# Patient Record
Sex: MALE | Race: BLACK OR AFRICAN AMERICAN | Employment: UNEMPLOYED | URBAN - METROPOLITAN AREA
[De-identification: names, ages, dates, MRNs, and addresses within clinical notes are randomized per-mention and may not be internally consistent; named-entity substitution may affect disease eponyms.]

---

## 2021-01-01 ENCOUNTER — APPOINTMENT (OUTPATIENT)
Dept: CT IMAGING | Facility: HOSPITAL | Age: 51
DRG: 871 | End: 2021-01-01
Attending: HOSPITALIST
Payer: MEDICAID

## 2021-01-01 ENCOUNTER — TELEPHONE (OUTPATIENT)
Dept: HEMATOLOGY/ONCOLOGY | Facility: HOSPITAL | Age: 51
End: 2021-01-01

## 2021-01-01 ENCOUNTER — APPOINTMENT (OUTPATIENT)
Dept: CT IMAGING | Facility: HOSPITAL | Age: 51
DRG: 871 | End: 2021-01-01
Attending: INTERNAL MEDICINE
Payer: MEDICAID

## 2021-01-01 ENCOUNTER — HOSPITAL ENCOUNTER (INPATIENT)
Facility: HOSPITAL | Age: 51
LOS: 5 days | Discharge: HOME | DRG: 871 | End: 2021-01-01
Attending: EMERGENCY MEDICINE | Admitting: EMERGENCY MEDICINE
Payer: MEDICAID

## 2021-01-01 ENCOUNTER — APPOINTMENT (OUTPATIENT)
Dept: GENERAL RADIOLOGY | Facility: HOSPITAL | Age: 51
DRG: 871 | End: 2021-01-01
Attending: EMERGENCY MEDICINE
Payer: MEDICAID

## 2021-01-01 ENCOUNTER — APPOINTMENT (OUTPATIENT)
Dept: GENERAL RADIOLOGY | Facility: HOSPITAL | Age: 51
DRG: 871 | End: 2021-01-01
Attending: HOSPITALIST
Payer: MEDICAID

## 2021-01-01 ENCOUNTER — APPOINTMENT (OUTPATIENT)
Dept: NUCLEAR MEDICINE | Facility: HOSPITAL | Age: 51
DRG: 871 | End: 2021-01-01
Attending: INTERNAL MEDICINE
Payer: MEDICAID

## 2021-01-01 ENCOUNTER — APPOINTMENT (OUTPATIENT)
Dept: GENERAL RADIOLOGY | Facility: HOSPITAL | Age: 51
DRG: 871 | End: 2021-01-01
Attending: CLINICAL NURSE SPECIALIST
Payer: MEDICAID

## 2021-01-01 VITALS
WEIGHT: 120.13 LBS | TEMPERATURE: 97 F | RESPIRATION RATE: 18 BRPM | HEIGHT: 74 IN | HEART RATE: 57 BPM | DIASTOLIC BLOOD PRESSURE: 85 MMHG | SYSTOLIC BLOOD PRESSURE: 166 MMHG | OXYGEN SATURATION: 100 % | BODY MASS INDEX: 15.42 KG/M2

## 2021-01-01 DIAGNOSIS — U07.1 COVID-19: Primary | ICD-10-CM

## 2021-01-01 DIAGNOSIS — R79.89 ELEVATED PROCALCITONIN: ICD-10-CM

## 2021-01-01 DIAGNOSIS — R91.8 MASS OF UPPER LOBE OF RIGHT LUNG: ICD-10-CM

## 2021-01-01 DIAGNOSIS — D72.825 BANDEMIA: ICD-10-CM

## 2021-01-01 DIAGNOSIS — J18.1 CONSOLIDATION OF RIGHT UPPER LOBE OF LUNG (HCC): ICD-10-CM

## 2021-01-01 DIAGNOSIS — R07.89 CHEST PAIN, ATYPICAL: ICD-10-CM

## 2021-01-01 DIAGNOSIS — N28.9 RENAL INSUFFICIENCY: ICD-10-CM

## 2021-01-01 LAB
ALBUMIN SERPL-MCNC: 1.4 G/DL (ref 3.4–5)
ALBUMIN SERPL-MCNC: 1.7 G/DL (ref 3.4–5)
ALBUMIN/GLOB SERPL: 0.3 {RATIO} (ref 1–2)
ALBUMIN/GLOB SERPL: 0.3 {RATIO} (ref 1–2)
ALP LIVER SERPL-CCNC: 508 U/L
ALP LIVER SERPL-CCNC: 590 U/L
ALT SERPL-CCNC: 11 U/L
ALT SERPL-CCNC: 15 U/L
ANION GAP SERPL CALC-SCNC: 6 MMOL/L (ref 0–18)
ANION GAP SERPL CALC-SCNC: 6 MMOL/L (ref 0–18)
ANION GAP SERPL CALC-SCNC: 7 MMOL/L (ref 0–18)
ANION GAP SERPL CALC-SCNC: 7 MMOL/L (ref 0–18)
ANION GAP SERPL CALC-SCNC: 9 MMOL/L (ref 0–18)
ANTIBODY SCREEN: NEGATIVE
AST SERPL-CCNC: 17 U/L (ref 15–37)
AST SERPL-CCNC: 22 U/L (ref 15–37)
BASOPHILS # BLD: 0 X10(3) UL (ref 0–0.2)
BASOPHILS NFR BLD: 0 %
BILIRUB SERPL-MCNC: 0.4 MG/DL (ref 0.1–2)
BILIRUB SERPL-MCNC: 0.5 MG/DL (ref 0.1–2)
BILIRUB UR QL: NEGATIVE
BUN BLD-MCNC: 58 MG/DL (ref 7–18)
BUN BLD-MCNC: 67 MG/DL (ref 7–18)
BUN BLD-MCNC: 69 MG/DL (ref 7–18)
BUN BLD-MCNC: 69 MG/DL (ref 7–18)
BUN BLD-MCNC: 80 MG/DL (ref 7–18)
BUN/CREAT SERPL: 19.7 (ref 10–20)
BUN/CREAT SERPL: 20.5 (ref 10–20)
BUN/CREAT SERPL: 20.8 (ref 10–20)
BUN/CREAT SERPL: 32.1 (ref 10–20)
BUN/CREAT SERPL: 32.8 (ref 10–20)
CALCIUM BLD-MCNC: 8.8 MG/DL (ref 8.5–10.1)
CALCIUM BLD-MCNC: 9.1 MG/DL (ref 8.5–10.1)
CALCIUM BLD-MCNC: 9.3 MG/DL (ref 8.5–10.1)
CALCIUM BLD-MCNC: 9.4 MG/DL (ref 8.5–10.1)
CALCIUM BLD-MCNC: 9.5 MG/DL (ref 8.5–10.1)
CHLORIDE SERPL-SCNC: 100 MMOL/L (ref 98–112)
CHLORIDE SERPL-SCNC: 104 MMOL/L (ref 98–112)
CHLORIDE SERPL-SCNC: 104 MMOL/L (ref 98–112)
CHLORIDE SERPL-SCNC: 106 MMOL/L (ref 98–112)
CHLORIDE SERPL-SCNC: 108 MMOL/L (ref 98–112)
CK SERPL-CCNC: 14 U/L
CLARITY UR: CLEAR
CO2 SERPL-SCNC: 22 MMOL/L (ref 21–32)
CO2 SERPL-SCNC: 23 MMOL/L (ref 21–32)
CO2 SERPL-SCNC: 23 MMOL/L (ref 21–32)
CO2 SERPL-SCNC: 25 MMOL/L (ref 21–32)
CO2 SERPL-SCNC: 27 MMOL/L (ref 21–32)
COLOR UR: YELLOW
CREAT BLD-MCNC: 2.15 MG/DL
CREAT BLD-MCNC: 2.44 MG/DL
CREAT BLD-MCNC: 2.79 MG/DL
CREAT BLD-MCNC: 3.37 MG/DL
CREAT BLD-MCNC: 3.4 MG/DL
CRP SERPL-MCNC: 13.9 MG/DL (ref ?–0.3)
CRP SERPL-MCNC: 15.9 MG/DL (ref ?–0.3)
CRP SERPL-MCNC: 17.8 MG/DL (ref ?–0.3)
CRP SERPL-MCNC: 5.09 MG/DL (ref ?–0.3)
CRP SERPL-MCNC: 9.16 MG/DL (ref ?–0.3)
D DIMER PPP FEU-MCNC: 3.33 UG/ML FEU (ref ?–0.52)
D DIMER PPP FEU-MCNC: 3.42 UG/ML FEU (ref ?–0.52)
D DIMER PPP FEU-MCNC: 3.67 UG/ML FEU (ref ?–0.52)
D DIMER PPP FEU-MCNC: 3.67 UG/ML FEU (ref ?–0.52)
D DIMER PPP FEU-MCNC: 4.67 UG/ML FEU (ref ?–0.52)
DEPRECATED HBV CORE AB SER IA-ACNC: 1300.1 NG/ML
DEPRECATED HBV CORE AB SER IA-ACNC: 1688.5 NG/ML
DEPRECATED HBV CORE AB SER IA-ACNC: 1763.5 NG/ML
DEPRECATED HBV CORE AB SER IA-ACNC: 1888.5 NG/ML
DEPRECATED HBV CORE AB SER IA-ACNC: 1972.2 NG/ML
DEPRECATED RDW RBC AUTO: 54.2 FL (ref 35.1–46.3)
DEPRECATED RDW RBC AUTO: 54.4 FL (ref 35.1–46.3)
DEPRECATED RDW RBC AUTO: 54.6 FL (ref 35.1–46.3)
DEPRECATED RDW RBC AUTO: 54.6 FL (ref 35.1–46.3)
DEPRECATED RDW RBC AUTO: 56.2 FL (ref 35.1–46.3)
EOSINOPHIL # BLD: 0 X10(3) UL (ref 0–0.7)
EOSINOPHIL # BLD: 1.01 X10(3) UL (ref 0–0.7)
EOSINOPHIL NFR BLD: 0 %
EOSINOPHIL NFR BLD: 1 %
ERYTHROCYTE [DISTWIDTH] IN BLOOD BY AUTOMATED COUNT: 16.4 % (ref 11–15)
ERYTHROCYTE [DISTWIDTH] IN BLOOD BY AUTOMATED COUNT: 16.6 % (ref 11–15)
ERYTHROCYTE [DISTWIDTH] IN BLOOD BY AUTOMATED COUNT: 16.7 % (ref 11–15)
EST. AVERAGE GLUCOSE BLD GHB EST-MCNC: 88 MG/DL (ref 68–126)
GLOBULIN PLAS-MCNC: 4.6 G/DL (ref 2.8–4.4)
GLOBULIN PLAS-MCNC: 5.5 G/DL (ref 2.8–4.4)
GLUCOSE BLD-MCNC: 100 MG/DL (ref 70–99)
GLUCOSE BLD-MCNC: 108 MG/DL (ref 70–99)
GLUCOSE BLD-MCNC: 120 MG/DL (ref 70–99)
GLUCOSE BLD-MCNC: 134 MG/DL (ref 70–99)
GLUCOSE BLD-MCNC: 75 MG/DL (ref 70–99)
GLUCOSE BLDC GLUCOMTR-MCNC: 112 MG/DL (ref 70–99)
GLUCOSE BLDC GLUCOMTR-MCNC: 121 MG/DL (ref 70–99)
GLUCOSE BLDC GLUCOMTR-MCNC: 122 MG/DL (ref 70–99)
GLUCOSE BLDC GLUCOMTR-MCNC: 123 MG/DL (ref 70–99)
GLUCOSE BLDC GLUCOMTR-MCNC: 165 MG/DL (ref 70–99)
GLUCOSE BLDC GLUCOMTR-MCNC: 177 MG/DL (ref 70–99)
GLUCOSE BLDC GLUCOMTR-MCNC: 189 MG/DL (ref 70–99)
GLUCOSE BLDC GLUCOMTR-MCNC: 190 MG/DL (ref 70–99)
GLUCOSE BLDC GLUCOMTR-MCNC: 192 MG/DL (ref 70–99)
GLUCOSE BLDC GLUCOMTR-MCNC: 198 MG/DL (ref 70–99)
GLUCOSE BLDC GLUCOMTR-MCNC: 207 MG/DL (ref 70–99)
GLUCOSE BLDC GLUCOMTR-MCNC: 220 MG/DL (ref 70–99)
GLUCOSE BLDC GLUCOMTR-MCNC: 227 MG/DL (ref 70–99)
GLUCOSE BLDC GLUCOMTR-MCNC: 254 MG/DL (ref 70–99)
GLUCOSE BLDC GLUCOMTR-MCNC: 86 MG/DL (ref 70–99)
GLUCOSE BLDC GLUCOMTR-MCNC: 91 MG/DL (ref 70–99)
GLUCOSE BLDC GLUCOMTR-MCNC: 95 MG/DL (ref 70–99)
GLUCOSE BLDC GLUCOMTR-MCNC: 98 MG/DL (ref 70–99)
GLUCOSE UR-MCNC: NEGATIVE MG/DL
HAV IGM SER QL: 2.2 MG/DL (ref 1.6–2.6)
HAV IGM SER QL: 2.6 MG/DL (ref 1.6–2.6)
HAV IGM SER QL: NONREACTIVE
HBA1C MFR BLD HPLC: 4.7 % (ref ?–5.7)
HBV CORE IGM SER QL: NONREACTIVE
HBV SURFACE AG SERPL QL IA: NONREACTIVE
HCT VFR BLD AUTO: 22.1 %
HCT VFR BLD AUTO: 23.8 %
HCT VFR BLD AUTO: 24.3 %
HCT VFR BLD AUTO: 26.1 %
HCT VFR BLD AUTO: 27.3 %
HCV AB SERPL QL IA: NONREACTIVE
HGB BLD-MCNC: 7 G/DL
HGB BLD-MCNC: 7.4 G/DL
HGB BLD-MCNC: 7.4 G/DL
HGB BLD-MCNC: 8.2 G/DL
HGB BLD-MCNC: 8.5 G/DL
INR BLD: 1.19 (ref 0.9–1.2)
KETONES UR-MCNC: NEGATIVE MG/DL
LACTATE SERPL-SCNC: 2.1 MMOL/L (ref 0.4–2)
LACTATE SERPL-SCNC: 2.2 MMOL/L (ref 0.4–2)
LACTATE SERPL-SCNC: 2.8 MMOL/L (ref 0.4–2)
LDH SERPL L TO P-CCNC: 139 U/L
LDH SERPL L TO P-CCNC: 139 U/L
LDH SERPL L TO P-CCNC: 163 U/L
LDH SERPL L TO P-CCNC: 244 U/L
LEUKOCYTE ESTERASE UR QL STRIP.AUTO: NEGATIVE
LYMPHOCYTES NFR BLD: 2 %
LYMPHOCYTES NFR BLD: 2.82 X10(3) UL (ref 1–4)
LYMPHOCYTES NFR BLD: 3 %
LYMPHOCYTES NFR BLD: 3 %
LYMPHOCYTES NFR BLD: 3.43 X10(3) UL (ref 1–4)
LYMPHOCYTES NFR BLD: 3.66 X10(3) UL (ref 1–4)
LYMPHOCYTES NFR BLD: 4 %
LYMPHOCYTES NFR BLD: 4.89 X10(3) UL (ref 1–4)
LYMPHOCYTES NFR BLD: 6 %
LYMPHOCYTES NFR BLD: 6.04 X10(3) UL (ref 1–4)
M PROTEIN MFR SERPL ELPH: 6 G/DL (ref 6.4–8.2)
M PROTEIN MFR SERPL ELPH: 7.2 G/DL (ref 6.4–8.2)
M TB CMPLX RRNA SPEC QL PROBE: NOT DETECTED
MAYO HISTOPLASMA AG RESULT: NEGATIVE
MAYO HISTOPLASMA AG VALUE: 0 NG/ML
MCH RBC QN AUTO: 27.9 PG (ref 26–34)
MCH RBC QN AUTO: 28.7 PG (ref 26–34)
MCH RBC QN AUTO: 28.8 PG (ref 26–34)
MCH RBC QN AUTO: 28.9 PG (ref 26–34)
MCH RBC QN AUTO: 29.3 PG (ref 26–34)
MCHC RBC AUTO-ENTMCNC: 30.5 G/DL (ref 31–37)
MCHC RBC AUTO-ENTMCNC: 31.1 G/DL (ref 31–37)
MCHC RBC AUTO-ENTMCNC: 31.1 G/DL (ref 31–37)
MCHC RBC AUTO-ENTMCNC: 31.4 G/DL (ref 31–37)
MCHC RBC AUTO-ENTMCNC: 31.7 G/DL (ref 31–37)
MCV RBC AUTO: 91.3 FL
MCV RBC AUTO: 91.7 FL
MCV RBC AUTO: 92.5 FL
MCV RBC AUTO: 92.6 FL
MCV RBC AUTO: 92.9 FL
METAMYELOCYTES # BLD: 2.01 X10(3) UL
METAMYELOCYTES NFR BLD: 2 %
MONOCYTES # BLD: 0 X10(3) UL (ref 0.1–1)
MONOCYTES # BLD: 1.22 X10(3) UL (ref 0.1–1)
MONOCYTES # BLD: 2.01 X10(3) UL (ref 0.1–1)
MONOCYTES # BLD: 2.82 X10(3) UL (ref 0.1–1)
MONOCYTES # BLD: 3.67 X10(3) UL (ref 0.1–1)
MONOCYTES NFR BLD: 0 %
MONOCYTES NFR BLD: 1 %
MONOCYTES NFR BLD: 2 %
MONOCYTES NFR BLD: 2 %
MONOCYTES NFR BLD: 3 %
MORPHOLOGY: NORMAL
MYELOCYTES # BLD: 1.01 X10(3) UL
MYELOCYTES # BLD: 1.41 X10(3) UL
MYELOCYTES NFR BLD: 1 %
MYELOCYTES NFR BLD: 1 %
NEUTROPHILS # BLD AUTO: 107.21 X10 (3) UL (ref 1.5–7.7)
NEUTROPHILS # BLD AUTO: 112.16 X10 (3) UL (ref 1.5–7.7)
NEUTROPHILS # BLD AUTO: 112.83 X10 (3) UL (ref 1.5–7.7)
NEUTROPHILS # BLD AUTO: 127.82 X10 (3) UL (ref 1.5–7.7)
NEUTROPHILS # BLD AUTO: 91.68 X10 (3) UL (ref 1.5–7.7)
NEUTROPHILS NFR BLD: 56 %
NEUTROPHILS NFR BLD: 63 %
NEUTROPHILS NFR BLD: 82 %
NEUTROPHILS NFR BLD: 86 %
NEUTROPHILS NFR BLD: 91 %
NEUTS BAND NFR BLD: 10 %
NEUTS BAND NFR BLD: 11 %
NEUTS BAND NFR BLD: 25 %
NEUTS BAND NFR BLD: 39 %
NEUTS BAND NFR BLD: 6 %
NEUTS HYPERSEG # BLD: 110.97 X10(3) UL (ref 1.5–7.7)
NEUTS HYPERSEG # BLD: 113.74 X10(3) UL (ref 1.5–7.7)
NEUTS HYPERSEG # BLD: 117.02 X10(3) UL (ref 1.5–7.7)
NEUTS HYPERSEG # BLD: 134.05 X10(3) UL (ref 1.5–7.7)
NEUTS HYPERSEG # BLD: 88.53 X10(3) UL (ref 1.5–7.7)
NITRITE UR QL STRIP.AUTO: NEGATIVE
OSMOLALITY SERPL CALC.SUM OF ELEC: 294 MOSM/KG (ref 275–295)
OSMOLALITY SERPL CALC.SUM OF ELEC: 303 MOSM/KG (ref 275–295)
OSMOLALITY SERPL CALC.SUM OF ELEC: 304 MOSM/KG (ref 275–295)
PH UR: 6 [PH] (ref 5–8)
PLATELET # BLD AUTO: 477 10(3)UL (ref 150–450)
PLATELET # BLD AUTO: 502 10(3)UL (ref 150–450)
PLATELET # BLD AUTO: 573 10(3)UL (ref 150–450)
PLATELET # BLD AUTO: 644 10(3)UL (ref 150–450)
PLATELET # BLD AUTO: 648 10(3)UL (ref 150–450)
PLATELET MORPHOLOGY: NORMAL
POTASSIUM SERPL-SCNC: 3.8 MMOL/L (ref 3.5–5.1)
POTASSIUM SERPL-SCNC: 4.3 MMOL/L (ref 3.5–5.1)
POTASSIUM SERPL-SCNC: 4.8 MMOL/L (ref 3.5–5.1)
POTASSIUM SERPL-SCNC: 4.9 MMOL/L (ref 3.5–5.1)
POTASSIUM SERPL-SCNC: 5.1 MMOL/L (ref 3.5–5.1)
PROCALCITONIN SERPL-MCNC: 22.8 NG/ML (ref ?–0.16)
PROT UR-MCNC: NEGATIVE MG/DL
PROTHROMBIN TIME: 14.9 SECONDS (ref 11.8–14.5)
RBC # BLD AUTO: 2.39 X10(6)UL
RBC # BLD AUTO: 2.57 X10(6)UL
RBC # BLD AUTO: 2.65 X10(6)UL
RBC # BLD AUTO: 2.86 X10(6)UL
RBC # BLD AUTO: 2.94 X10(6)UL
RBC #/AREA URNS AUTO: 2 /HPF
RH BLOOD TYPE: POSITIVE
SARS-COV-2 RNA RESP QL NAA+PROBE: NOT DETECTED
SARS-COV-2 RNA RESP QL NAA+PROBE: NOT DETECTED
SODIUM SERPL-SCNC: 133 MMOL/L (ref 136–145)
SODIUM SERPL-SCNC: 135 MMOL/L (ref 136–145)
SODIUM SERPL-SCNC: 136 MMOL/L (ref 136–145)
SODIUM SERPL-SCNC: 136 MMOL/L (ref 136–145)
SODIUM SERPL-SCNC: 137 MMOL/L (ref 136–145)
SP GR UR STRIP: 1.01 (ref 1–1.03)
T PALLIDUM AB SER QL: NEGATIVE
TOTAL CELLS COUNTED: 100
TROPONIN I SERPL-MCNC: <0.045 NG/ML (ref ?–0.04)
UROBILINOGEN UR STRIP-ACNC: <2
VANCOMYCIN TROUGH SERPL-MCNC: 9.8 UG/ML (ref 10–20)
WBC # BLD AUTO: 100.6 X10(3) UL (ref 4–11)
WBC # BLD AUTO: 114.4 X10(3) UL (ref 4–11)
WBC # BLD AUTO: 121.9 X10(3) UL (ref 4–11)
WBC # BLD AUTO: 122.3 X10(3) UL (ref 4–11)
WBC # BLD AUTO: 141.1 X10(3) UL (ref 4–11)
WBC #/AREA URNS AUTO: <1 /HPF

## 2021-01-01 PROCEDURE — 99254 IP/OBS CNSLTJ NEW/EST MOD 60: CPT | Performed by: INTERNAL MEDICINE

## 2021-01-01 PROCEDURE — 99233 SBSQ HOSP IP/OBS HIGH 50: CPT | Performed by: HOSPITALIST

## 2021-01-01 PROCEDURE — 99152 MOD SED SAME PHYS/QHP 5/>YRS: CPT | Performed by: INTERNAL MEDICINE

## 2021-01-01 PROCEDURE — 71250 CT THORAX DX C-: CPT | Performed by: HOSPITALIST

## 2021-01-01 PROCEDURE — 32408 CORE NDL BX LNG/MED PERQ: CPT | Performed by: INTERNAL MEDICINE

## 2021-01-01 PROCEDURE — 71045 X-RAY EXAM CHEST 1 VIEW: CPT | Performed by: EMERGENCY MEDICINE

## 2021-01-01 PROCEDURE — 99223 1ST HOSP IP/OBS HIGH 75: CPT | Performed by: INTERNAL MEDICINE

## 2021-01-01 PROCEDURE — 99231 SBSQ HOSP IP/OBS SF/LOW 25: CPT | Performed by: INTERNAL MEDICINE

## 2021-01-01 PROCEDURE — 99233 SBSQ HOSP IP/OBS HIGH 50: CPT | Performed by: INTERNAL MEDICINE

## 2021-01-01 PROCEDURE — 71045 X-RAY EXAM CHEST 1 VIEW: CPT | Performed by: CLINICAL NURSE SPECIALIST

## 2021-01-01 PROCEDURE — 99232 SBSQ HOSP IP/OBS MODERATE 35: CPT | Performed by: INTERNAL MEDICINE

## 2021-01-01 PROCEDURE — 78580 LUNG PERFUSION IMAGING: CPT | Performed by: INTERNAL MEDICINE

## 2021-01-01 PROCEDURE — 74019 RADEX ABDOMEN 2 VIEWS: CPT | Performed by: HOSPITALIST

## 2021-01-01 PROCEDURE — 0BBC3ZX EXCISION OF RIGHT UPPER LUNG LOBE, PERCUTANEOUS APPROACH, DIAGNOSTIC: ICD-10-PCS | Performed by: RADIOLOGY

## 2021-01-01 PROCEDURE — 74176 CT ABD & PELVIS W/O CONTRAST: CPT | Performed by: HOSPITALIST

## 2021-01-01 PROCEDURE — 3E0333Z INTRODUCTION OF ANTI-INFLAMMATORY INTO PERIPHERAL VEIN, PERCUTANEOUS APPROACH: ICD-10-PCS | Performed by: HOSPITALIST

## 2021-01-01 RX ORDER — FUROSEMIDE 20 MG/1
20 TABLET ORAL 2 TIMES DAILY
COMMUNITY

## 2021-01-01 RX ORDER — GUAIFENESIN 100 MG/5ML
200 SOLUTION ORAL EVERY 4 HOURS PRN
Status: DISCONTINUED | OUTPATIENT
Start: 2021-01-01 | End: 2021-01-01

## 2021-01-01 RX ORDER — ORPHENADRINE CITRATE 30 MG/ML
60 INJECTION INTRAMUSCULAR; INTRAVENOUS ONCE
Status: COMPLETED | OUTPATIENT
Start: 2021-01-01 | End: 2021-01-01

## 2021-01-01 RX ORDER — VANCOMYCIN HYDROCHLORIDE
25 ONCE
Status: COMPLETED | OUTPATIENT
Start: 2021-01-01 | End: 2021-01-01

## 2021-01-01 RX ORDER — ACETAMINOPHEN 325 MG/1
325 TABLET ORAL EVERY 6 HOURS PRN
COMMUNITY

## 2021-01-01 RX ORDER — HEPARIN SODIUM 5000 [USP'U]/ML
5000 INJECTION, SOLUTION INTRAVENOUS; SUBCUTANEOUS EVERY 8 HOURS SCHEDULED
Status: DISCONTINUED | OUTPATIENT
Start: 2021-01-01 | End: 2021-01-01

## 2021-01-01 RX ORDER — CLONIDINE HYDROCHLORIDE 0.1 MG/1
0.03 TABLET ORAL 2 TIMES DAILY
Status: DISCONTINUED | OUTPATIENT
Start: 2021-01-01 | End: 2021-01-01

## 2021-01-01 RX ORDER — SODIUM CHLORIDE 9 MG/ML
INJECTION, SOLUTION INTRAVENOUS CONTINUOUS
Status: DISCONTINUED | OUTPATIENT
Start: 2021-01-01 | End: 2021-01-01

## 2021-01-01 RX ORDER — HYDROCODONE BITARTRATE AND ACETAMINOPHEN 5; 325 MG/1; MG/1
2 TABLET ORAL EVERY 4 HOURS PRN
Status: DISCONTINUED | OUTPATIENT
Start: 2021-01-01 | End: 2021-01-01

## 2021-01-01 RX ORDER — HYDROCODONE BITARTRATE AND ACETAMINOPHEN 10; 325 MG/1; MG/1
1 TABLET ORAL EVERY 4 HOURS PRN
Qty: 20 TABLET | Refills: 0 | Status: SHIPPED | OUTPATIENT
Start: 2021-01-01

## 2021-01-01 RX ORDER — VANCOMYCIN HYDROCHLORIDE 125 MG/1
125 CAPSULE ORAL DAILY
Qty: 14 CAPSULE | Refills: 0 | Status: SHIPPED | OUTPATIENT
Start: 2021-01-01 | End: 2021-01-01

## 2021-01-01 RX ORDER — ALBUTEROL SULFATE 90 UG/1
2 AEROSOL, METERED RESPIRATORY (INHALATION) EVERY 6 HOURS PRN
Qty: 1 INHALER | Refills: 0 | Status: SHIPPED | OUTPATIENT
Start: 2021-01-01

## 2021-01-01 RX ORDER — NALOXONE HYDROCHLORIDE 0.4 MG/ML
80 INJECTION, SOLUTION INTRAMUSCULAR; INTRAVENOUS; SUBCUTANEOUS AS NEEDED
Status: DISCONTINUED | OUTPATIENT
Start: 2021-01-01 | End: 2021-01-01 | Stop reason: ALTCHOICE

## 2021-01-01 RX ORDER — DICYCLOMINE HYDROCHLORIDE 10 MG/ML
20 INJECTION INTRAMUSCULAR ONCE
Status: COMPLETED | OUTPATIENT
Start: 2021-01-01 | End: 2021-01-01

## 2021-01-01 RX ORDER — MORPHINE SULFATE 4 MG/ML
4 INJECTION, SOLUTION INTRAMUSCULAR; INTRAVENOUS EVERY 2 HOUR PRN
Status: DISCONTINUED | OUTPATIENT
Start: 2021-01-01 | End: 2021-01-01

## 2021-01-01 RX ORDER — DILTIAZEM HYDROCHLORIDE 120 MG/1
120 CAPSULE, EXTENDED RELEASE ORAL DAILY
Qty: 30 CAPSULE | Refills: 11 | Status: SHIPPED | OUTPATIENT
Start: 2021-01-01 | End: 2022-01-15

## 2021-01-01 RX ORDER — VANCOMYCIN HYDROCHLORIDE 125 MG/1
125 CAPSULE ORAL DAILY
Status: DISCONTINUED | OUTPATIENT
Start: 2021-01-01 | End: 2021-01-01

## 2021-01-01 RX ORDER — DICYCLOMINE HCL 20 MG
20 TABLET ORAL 3 TIMES DAILY PRN
Status: DISCONTINUED | OUTPATIENT
Start: 2021-01-01 | End: 2021-01-01

## 2021-01-01 RX ORDER — ONDANSETRON 2 MG/ML
4 INJECTION INTRAMUSCULAR; INTRAVENOUS EVERY 4 HOURS PRN
Status: DISCONTINUED | OUTPATIENT
Start: 2021-01-01 | End: 2021-01-01

## 2021-01-01 RX ORDER — ALBUTEROL SULFATE 90 UG/1
2 AEROSOL, METERED RESPIRATORY (INHALATION)
COMMUNITY
Start: 2019-10-31

## 2021-01-01 RX ORDER — HYDROCODONE BITARTRATE AND ACETAMINOPHEN 5; 325 MG/1; MG/1
1 TABLET ORAL EVERY 4 HOURS PRN
Status: DISCONTINUED | OUTPATIENT
Start: 2021-01-01 | End: 2021-01-01

## 2021-01-01 RX ORDER — HYDRALAZINE HYDROCHLORIDE 20 MG/ML
10 INJECTION INTRAMUSCULAR; INTRAVENOUS EVERY 6 HOURS PRN
Status: DISCONTINUED | OUTPATIENT
Start: 2021-01-01 | End: 2021-01-01

## 2021-01-01 RX ORDER — AMOXICILLIN AND CLAVULANATE POTASSIUM 875; 125 MG/1; MG/1
1 TABLET, FILM COATED ORAL 2 TIMES DAILY
Qty: 14 TABLET | Refills: 0 | Status: SHIPPED | OUTPATIENT
Start: 2021-01-01 | End: 2021-01-01

## 2021-01-01 RX ORDER — MIDAZOLAM HYDROCHLORIDE 1 MG/ML
INJECTION INTRAMUSCULAR; INTRAVENOUS
Status: COMPLETED | OUTPATIENT
Start: 2021-01-01 | End: 2021-01-01

## 2021-01-01 RX ORDER — BISACODYL 10 MG
10 SUPPOSITORY, RECTAL RECTAL
Status: DISCONTINUED | OUTPATIENT
Start: 2021-01-01 | End: 2021-01-01

## 2021-01-01 RX ORDER — MIDAZOLAM HYDROCHLORIDE 1 MG/ML
1 INJECTION INTRAMUSCULAR; INTRAVENOUS EVERY 5 MIN PRN
Status: DISCONTINUED | OUTPATIENT
Start: 2021-01-01 | End: 2021-01-01 | Stop reason: ALTCHOICE

## 2021-01-01 RX ORDER — DEXTROSE MONOHYDRATE 25 G/50ML
50 INJECTION, SOLUTION INTRAVENOUS
Status: DISCONTINUED | OUTPATIENT
Start: 2021-01-01 | End: 2021-01-01

## 2021-01-01 RX ORDER — MORPHINE SULFATE 2 MG/ML
2 INJECTION, SOLUTION INTRAMUSCULAR; INTRAVENOUS EVERY 2 HOUR PRN
Status: DISCONTINUED | OUTPATIENT
Start: 2021-01-01 | End: 2021-01-01

## 2021-01-01 RX ORDER — SENNOSIDES 8.6 MG
17.2 TABLET ORAL NIGHTLY
Status: DISCONTINUED | OUTPATIENT
Start: 2021-01-01 | End: 2021-01-01

## 2021-01-01 RX ORDER — DILTIAZEM HYDROCHLORIDE 120 MG/1
120 CAPSULE, EXTENDED RELEASE ORAL DAILY
Status: DISCONTINUED | OUTPATIENT
Start: 2021-01-01 | End: 2021-01-01

## 2021-01-01 RX ORDER — MIDAZOLAM HYDROCHLORIDE 1 MG/ML
INJECTION INTRAMUSCULAR; INTRAVENOUS
Status: DISPENSED
Start: 2021-01-01 | End: 2021-01-01

## 2021-01-01 RX ORDER — ZOLPIDEM TARTRATE 5 MG/1
5 TABLET ORAL NIGHTLY PRN
Status: DISCONTINUED | OUTPATIENT
Start: 2021-01-01 | End: 2021-01-01

## 2021-01-01 RX ORDER — ALBUTEROL SULFATE 90 UG/1
AEROSOL, METERED RESPIRATORY (INHALATION)
Qty: 8.5 G | Refills: 0 | OUTPATIENT
Start: 2021-01-01

## 2021-01-01 RX ORDER — DOCUSATE SODIUM 100 MG/1
100 CAPSULE, LIQUID FILLED ORAL 2 TIMES DAILY
COMMUNITY

## 2021-01-01 RX ORDER — MORPHINE SULFATE 2 MG/ML
1 INJECTION, SOLUTION INTRAMUSCULAR; INTRAVENOUS EVERY 2 HOUR PRN
Status: DISCONTINUED | OUTPATIENT
Start: 2021-01-01 | End: 2021-01-01

## 2021-01-01 RX ORDER — DILTIAZEM HYDROCHLORIDE 60 MG/1
120 TABLET, FILM COATED ORAL DAILY
Status: DISCONTINUED | OUTPATIENT
Start: 2021-01-01 | End: 2021-01-01

## 2021-01-01 RX ORDER — FLUMAZENIL 0.1 MG/ML
0.2 INJECTION, SOLUTION INTRAVENOUS AS NEEDED
Status: DISCONTINUED | OUTPATIENT
Start: 2021-01-01 | End: 2021-01-01 | Stop reason: ALTCHOICE

## 2021-01-01 RX ORDER — CLONIDINE HYDROCHLORIDE 0.1 MG/1
0.1 TABLET ORAL 2 TIMES DAILY
Status: DISCONTINUED | OUTPATIENT
Start: 2021-01-01 | End: 2021-01-01

## 2021-01-01 RX ORDER — HYDRALAZINE HYDROCHLORIDE 20 MG/ML
10 INJECTION INTRAMUSCULAR; INTRAVENOUS ONCE
Status: COMPLETED | OUTPATIENT
Start: 2021-01-01 | End: 2021-01-01

## 2021-01-01 RX ORDER — MAGNESIUM HYDROXIDE/ALUMINUM HYDROXICE/SIMETHICONE 120; 1200; 1200 MG/30ML; MG/30ML; MG/30ML
30 SUSPENSION ORAL 4 TIMES DAILY PRN
Status: DISCONTINUED | OUTPATIENT
Start: 2021-01-01 | End: 2021-01-01

## 2021-01-01 RX ORDER — DEXAMETHASONE 2 MG/1
6 TABLET ORAL 2 TIMES DAILY WITH MEALS
Qty: 42 TABLET | Refills: 0 | Status: SHIPPED | OUTPATIENT
Start: 2021-01-01 | End: 2021-01-01

## 2021-01-01 RX ORDER — GUAIFENESIN 100 MG/5ML
200 SOLUTION ORAL EVERY 4 HOURS PRN
Qty: 473 ML | Refills: 0 | Status: SHIPPED | OUTPATIENT
Start: 2021-01-01

## 2021-01-01 RX ORDER — HYDROCODONE BITARTRATE AND ACETAMINOPHEN 10; 325 MG/1; MG/1
1 TABLET ORAL EVERY 4 HOURS PRN
Status: DISCONTINUED | OUTPATIENT
Start: 2021-01-01 | End: 2021-01-01

## 2021-01-01 RX ORDER — ALBUTEROL SULFATE 90 UG/1
2 AEROSOL, METERED RESPIRATORY (INHALATION) EVERY 4 HOURS PRN
Status: DISCONTINUED | OUTPATIENT
Start: 2021-01-01 | End: 2021-01-01

## 2021-01-01 RX ORDER — DEXAMETHASONE SODIUM PHOSPHATE 4 MG/ML
6 VIAL (ML) INJECTION DAILY
Status: DISCONTINUED | OUTPATIENT
Start: 2021-01-01 | End: 2021-01-01

## 2021-01-01 RX ORDER — ACETAMINOPHEN 325 MG/1
650 TABLET ORAL EVERY 4 HOURS PRN
Status: DISCONTINUED | OUTPATIENT
Start: 2021-01-01 | End: 2021-01-01

## 2021-01-10 PROBLEM — J18.1: Status: ACTIVE | Noted: 2021-01-01

## 2021-01-10 PROBLEM — U07.1 COVID-19: Status: ACTIVE | Noted: 2021-01-01

## 2021-01-10 PROBLEM — R07.89 CHEST PAIN, ATYPICAL: Status: ACTIVE | Noted: 2021-01-01

## 2021-01-10 PROBLEM — R79.89 ELEVATED PROCALCITONIN: Status: ACTIVE | Noted: 2021-01-01

## 2021-01-10 PROBLEM — N28.9 RENAL INSUFFICIENCY: Status: ACTIVE | Noted: 2021-01-01

## 2021-01-10 PROBLEM — D72.825 BANDEMIA: Status: ACTIVE | Noted: 2021-01-01

## 2021-01-10 NOTE — ED PROVIDER NOTES
Patient Seen in: [facility] AND [facility] Emergency Department    History   Patient presents with:  Chest Pain Angina  Difficulty Breathing  Covid    Stated Complaint: shortness of breath and cp     HPI    49-year-old male with past medical diabetes, hypertension except as noted above. PSFH elements reviewed from today and agreed except as otherwise stated in HPI.     Physical Exam     ED Triage Vitals   BP 01/10/21 1630 (!) 155/104   Pulse 01/10/21 1630 72   Resp 01/10/21 1630 20   Temp 01/10/21 1753 99.5 °F (37 0,686.0 (*)     All other components within normal limits   C-REACTIVE PROTEIN - Abnormal; Notable for the following components:    C-Reactive Protein 17.80 (*)     All other components within normal limits   CBC W/ DIFFERENTIAL - Abnormal; Notable for the Report. Rate: 76  Rhythm: Sinus Rhythm  Reading: NSR 76 without ST change as interpreted by myself           Xr Chest Ap Portable  (cpt=71045)    Result Date: 1/10/2021  PROCEDURE: XR CHEST AP PORTABLE  (CPT=71045) TIME: 1758 hours.    COMPARISON: [facility] labs notable for acute on chronic leukocytosis though complicated by bandemia (40% by preliminary differential) and notable for acute on chronic renal sufficiency.   Sepsis bolus initiated in setting of leukocytosis (143) with pancultures obtained and Zosyn

## 2021-01-10 NOTE — ED INITIAL ASSESSMENT (HPI)
Patient complain of chest pain and shortness of breath. Patient tested covid + last week. Patient has lung cancer. No treatment due to covid.

## 2021-01-11 NOTE — H&P
[address] Patient Status:  Emergency    1970  44year old [facility] [id number]   Location  Attending Jaxson Mazariegos MD     PCP PHYSICIAN NONSTAFF         DATE OF ADMISSION: 01/10/21 HISTORY  Social History    Socioeconomic History      Marital status: Single      Spouse name: Not on file      Number of children: Not on file      Years of education: Not on file      Highest education level: Not on file    Tobacco Use      Smoking statu ASSESSMENT/PLAN    Sepsis  -Likely secondary to lung source  -Started on broad-spectrum antibiotics, Vanco and cefepime due to kidney function  -Lactic acid mildly elevated, starting on IV fluids  -Follow-up blood cultures    COVID-19 virus infection

## 2021-01-11 NOTE — ED NOTES
Orders for admission, patient is aware of plan and ready to go upstairs. Any questions, please call ED RN Jenn Herzog  at extension [phone].      Type of COVID test sent: Rapid (negative) and  for confirmation  COVID Suspicion level: High - pt reports posit

## 2021-01-11 NOTE — PROGRESS NOTES
Pt asked transport to return after breakfast for VQ scan, after transport came back after his meal pt stated \"I'm not feeling well, I want to reschedule scan\". MD was notified, stated that pt told MD he would be agreeable to imaging in afternoon.  Discuss

## 2021-01-11 NOTE — PLAN OF CARE
Problem: Patient Centered Care  Goal: Patient preferences are identified and integrated in the patient's plan of care  Description: Interventions:  - What would you like us to know as we care for you?  I was just discharge from [facility] a week ago with Yash Scales by Gwyn Maximus, RN  Outcome: Progressing

## 2021-01-11 NOTE — CONSULTS
[address] Patient Status:  Inpatient    1970 MRN [id number]   Location [facility] 5SW/SE Attending Andreia Palacios MD   Hosp Day # 1 PCP PHYSICIAN NONSTAFF       Reason for C reports that he has quit smoking.  He has never used smokeless tobacco.    Allergies:  No Known Allergies    Medications:    Current Facility-Administered Medications:   •  Albuterol Sulfate  (90 Base) MCG/ACT inhaler 2 puff, 2 puff, Inhalation, Q4H GASTROINTESTINAL:  No anorexia, nausea, vomiting or diarrhea. No abdominal pain or blood. GENITOURINARY:  No Burning on urination. NEUROLOGICAL:  No headache, dizziness, syncope, paralysis, ataxia, numbness or tingling in the extremities.   Adriana Fleming 25-year-old male with a history of diabetes, HTN, lung cancer with recent diagnosis of Covid on 1/2 now with worsening cough, shortness of breath, fatigue, weakness. Unclear about duration of symptoms.  Tested positive for COVID-19 on 1/2/21 and states mayb # Severe leukocytosis with bandemia - infection related vs malignancy vs leukamoid; r/o bacteremia  # Bullous emphysema  # CKD      PLAN:    -  Started on RDV - will discuss with Pulmonary as based on duration of sx less likely to be beneficial  -  Hold CC

## 2021-01-11 NOTE — CONSULTS
[facility]    Report of Hematology/Oncology Consultation    Shoemaker Kareen Patient Status:  Inpatient    1970 MRN [id number]   Location 563/563-A Attending Shayy Contreras MD   Date of admission 1/10/2021  4:29 PM   PCP PHYSI Per records from [facility], the patient was first noted on October of 2019 for an admission for CP to have a RUL medial opacity. CT was recommended for follow up.   He was noted to have normocytic anemia with Hgb of 12 with rest of CBC normal.  His Cr was 2.29 pulmonary regarding bronchoscopy/EBUS for evaluation. The patient however left AMA before he was able to be evaluated.   Regarding the patient's hematologic process, his leukocytosis was felt to be secondary to underlying infection, leukemoid reaction acti Last visit to [facility] was on 10/13/2020 and was admitted to 10/15/2020. At that time he complained of shortness of breath and atypical chest pain which is thought secondary to right upper lobe lung mass which is right large.   Chelsea He then presented to our emergency room last night due to 12 hours of vague right-sided chest pain with shortness of breath, as well as abdominal pain. Is also having some diarrhea, which he states he has with withdrawal of heroin.   During this admission •  furosemide 20 MG Oral Tab, Take 20 mg by mouth 2 (two) times daily. , Disp: , Rfl:     •  acetaminophen 325 MG Oral Tab, Take 325 mg by mouth every 6 (six) hours as needed for Pain., Disp: , Rfl:       • dexamethasone Sodium Phosphate  6 mg Intravenous D Extremities: Pedal pulses are present. No edema. Psychiatric: Patient appears slightly anxious. Laboratory Data:      Recent Results (from the past 24 hour(s))   RAPID SARS-COV-2 BY PCR    Collection Time: 01/10/21  6:46 PM    Specimen: Nares;  Other BUN 67 (H) 7 - 18 mg/dL    Creatinine 3.40 (H) 0.70 - 1.30 mg/dL    BUN/CREA Ratio 19.7 10.0 - 20.0    Calcium, Total 9.3 8.5 - 10.1 mg/dL    Calculated Osmolality 303 (H) 275 - 295 mOsm/kg    GFR, Non- 20 (L) >=60    GFR, -American Basophil Absolute Manual 0.00 0.00 - 0.20 x10(3) uL    Metamyelocyte Absolute Manual 2.01 (H) 0 x10(3) uL    Myelocyte Absolute Manual 1.01 (H) 0 x10(3) uL    Neutrophils % Manual 63 %    Band % 25 %    Lymphocyte % Manual 6 %    Monocyte % Manual 2 % CONCLUSION:  1. Large heterogeneous/likely necrotic 8.4 x 8.2 x 11.2 cm right upper lobe lung mass, with associated complete consolidation of the right upper lobe.   There is an additional likely necrotic 4.5 cm satellite mass in the superior segment of the CONCLUSION: Perfusion lung scan findings are low probability for pulmonary embolism    Dictated by (CST): Robbie Lackey MD on 1/11/2021 at 3:08 PM     Finalized by (CST): Robbie Lackey MD on 1/11/2021 at 3:51 PM          Xr Chest Ap Portable  (cp --Could be related to but less likely a myeloproliferative disorder, including chronic myelogenous leukemia.   This can be diagnosed by sending peripheral blood for BCR–ABL mutation, which can be performed as an outpatient, given that the results will not b

## 2021-01-11 NOTE — PLAN OF CARE
Covid +: Rapid and PCR negative.  Patient reports covid positive at cook count on 21  Symptom Onset: 21  Tmax: Afebrile  O2: room Air @ 99%    Monitor Labs,    LDH: 139  Ferritin: 1888 -> 1688  CRP: 17.80 -> 13.90  DDimer: 4.67 -> 3.33

## 2021-01-11 NOTE — PROGRESS NOTES
Sx onset- dx 1/1, sx worsening over the past week  Covid test- rapid negative,  pending  Pt reports positive test last week in [facility]), 1/1  Admitted- 1/10     CXR- 1/10- RUL consolidation  VQ scan- ordered 1/10     DVT prophylaxis- Heparin TID

## 2021-01-11 NOTE — CONSULTS
[facility]    Report of Consultation    Navid Mathur Patient Status:  Inpatient    1970 MRN [id number]   Location [facility] 5SW/SE Attending Shayy Contreras MD   Hosp Day # 1 PCP PHYSICIAN NONSTAFF     Date of Admissi Use      Smoking status: Former Smoker      Smokeless tobacco: Never Used    Alcohol use: Not on file    Drug use: Not on file         Current Medications:    •  Albuterol Sulfate  (90 Base) MCG/ACT inhaler 2 puff, 2 puff, Inhalation, Q4H PRN    • Pain.        Allergies  No Known Allergies    Review of Systems:    Pertinent items are noted in HPI. Physical Exam:   Blood pressure (!) 176/85, pulse 87, temperature 100.3 °F (37.9 °C), temperature source Oral, resp.  rate 20, height 6' 2\" (1.88 m), w given overall imaging morphology. Small foci of gas along the medial/paramediastinal margin of the right upper lobe, which could relate to engulfed bullous disease or early cavitation.   Notably, the aforementioned dominant right upper lobe mass extends to COVID-19  Tested positive 1/2/2021   Elevated inflammtory markers   RDV   Supportive care   Decadron     2- post obstructive pneumonia   cefepime and vancomycin     3- severe leukocytosis   Secondary to infection and possible leukemoid reaction as well

## 2021-01-12 NOTE — PLAN OF CARE
Problem: Patient Centered Care  Goal: Patient preferences are identified and integrated in the patient's plan of care  Description: Interventions:  - What would you like us to know as we care for you?  I was just discharge from [facility] a week ago with Daina Galvez appropriate and evaluate response  - Consider cultural and social influences on pain and pain management  - Manage/alleviate anxiety  - Utilize distraction and/or relaxation techniques  - Monitor for opioid side effects  - Notify MD/LIP if interventions un abdominal cramping. Refused heparin and SCD. IVF infusing. Will continue to monitor.

## 2021-01-12 NOTE — PLAN OF CARE
Problem: Patient Centered Care  Goal: Patient preferences are identified and integrated in the patient's plan of care  Description: Interventions:  - What would you like us to know as we care for you?  I was just discharge from [facility] a week ago with Viry Vicente RN  Outcome: Progressing     Problem: PAIN - ADULT  Goal: Verbalizes/displays adequate comfort level or patient's stated pain goal  Description: INTERVENTIONS:  - Encourage pt to monitor pain and request assistance  - Assess pain using appropriate pain sca resources and transportation as appropriate  - Identify discharge learning needs (meds, wound care, etc)  - Arrange for interpreters to assist at discharge as needed  - Consider post-discharge preferences of patient/family/discharge partner  - Complete RUSH

## 2021-01-12 NOTE — PROGRESS NOTES
INFECTIOUS DISEASE PROGRESS NOTE  [facility] ID TELEMEDICINE PROGRESS NOTE    Juan Ferris Patient Status:  Inpatient    1970 MRN [id number]   Location [facility] 5SW/SE Attending Jerardo Vance MD   [facility] 69-year-old male with a history of diabetes, HTN, lung cancer with recent diagnosis of Covid on 1/2 now with worsening cough, shortness of breath, fatigue, weakness. Unclear about duration of symptoms.  Tested positive for COVID-19 on 1/2/21 and states mayb # Daily heroin use (snort) - unclear if IVDU - r/o HIV, hepatitis, syphilis   -HIV ag ab nonreactive   -Hep C negative, Hep nonreactive, nonimmune   -FU RPR  # Severe leukocytosis with bandemia - infection related vs malignancy vs leukamoid; r/o bacteremia

## 2021-01-12 NOTE — PROGRESS NOTES
[facility]    Progress Note    Betty Payne Patient Status:  Inpatient    1970 MRN [id number]   Location [facility] 5SW/SE Attending Flakita Hampton MD   Hosp Day # 2 PCP PHYSICIAN NONSTAFF       SUBJECTIVE:    Renae Forth probable associated extensive postobstructive pneumonia. Necrotic pneumonia without associated malignancy is considered unlikely given overall imaging morphology.   Small foci of gas along the medial/paramediastinal margin of the right upper lobe, which co at 6:11 PM     Finalized by (CST): Conrad Mckeon MD on 1/10/2021 at 6:12 PM            Meds:     •  Dicyclomine HCl (BENTYL) tab 20 mg, 20 mg, Oral, TID PRN    •  Heparin Sodium (Porcine) 5000 UNIT/ML injection 5,000 Units, 5,000 Units, Subcutane Thrombocytosis ([facility] [address]      Plan:     Possible Sepsis  -Likely secondary to lung source  -Started on broad-spectrum antibiotics, Vanco and cefepime due to kidney function  -Lactic acid mildly elevated, starting on IV fluids  -Follow-up blood cultures  -ID an

## 2021-01-12 NOTE — DIETARY NOTE
ADULT NUTRITION INITIAL ASSESSMENT    Pt is at high nutrition risk. Pt meets severe malnutrition criteria.       CRITERIA FOR MALNUTRITION DIAGNOSIS:  Criteria for severe malnutrition diagnosis: chronic illness related to wt loss greater than 10% in 6 valarie 8482-6785 calories/day (35-40 calories per kg Current wt) for weight restoration.    Protein:  grams protein/day (1.5-2 grams protein per kg Current wt)  Fluid needs: 1mL/kcal or per MD     NUTRITION INTERVENTION:  - Diet: Regular/General  - RD Domingo maximize  Percent Meals Eaten (last 3 days)     Date/Time Percent Meals Eaten (%)    01/11/21 0900  50 %    01/11/21 1500  40 %               Food Allergies: No  Cultural/Ethnic/Latter-day Preferences: None    MEDICATIONS: reviewed  • Heparin Sodium (Porcin

## 2021-01-12 NOTE — PROGRESS NOTES
[facility]    Progress Note    Gautam Mayer Patient Status:  Inpatient    1970 MRN [id number]   Location [facility] 5SW/SE Attending Brayden Pringle MD   Hosp Day # 2 PCP PHYSICIAN NONSTAFF       SUBJECTIVE:    Beto Osvaldo Therefore, these masses likely relate to neoplasm with probable associated extensive postobstructive pneumonia. Necrotic pneumonia without associated malignancy is considered unlikely given overall imaging morphology.   Small foci of gas along the medial/p Dictated by (CST): Han Mckeon MD on 1/10/2021 at 6:11 PM     Finalized by (CST): Han Mckeon MD on 1/10/2021 at 6:12 PM            Meds:     •  Dicyclomine HCl (BENTYL) tab 20 mg, 20 mg, Oral, TID PRN    •  Heparin Sodium (Porcine) due to stage 4 chronic kidney disease (HCC)     Thrombocytosis (HCC)      Plan:     Possible Sepsis  -Likely secondary to lung source  -Started on broad-spectrum antibiotics, Vanco and cefepime due to kidney function  -Lactic acid mildly elevated, starting

## 2021-01-12 NOTE — PROGRESS NOTES
[facility]    Progress Note    Veverly Tamara Patient Status:  Inpatient    1970 MRN [id number]   Location [facility] 5SW/SE Attending Chi Tompkins MD   Hosp Day # 2 PCP PHYSICIAN NONSTAFF        Subjective:     Con metastatic malignancy     Diagnosed at [facility] in October 2020 / pt claimed biopsy / but NO records for biopsy   Followed at [facility] for further management of his cancer /complicated with recent diagnosis of COVID-19     Patient claimed that he extensive postobstructive pneumonia. Necrotic pneumonia without associated malignancy is considered unlikely given overall imaging morphology.   Small foci of gas along the medial/paramediastinal margin of the right upper lobe, which could relate to engulf Finalized by (CST): Bart Mckeon MD on 1/10/2021 at 6:12 PM          Ekg 12-lead    Result Date: 1/10/2021  ECG Report  Interpretation  -------------------------- Sinus Rhythm -Combined atrial enlargement. - Negative precordial T-waves.  Roberto Lot

## 2021-01-12 NOTE — PLAN OF CARE
Covid +: Rapid and PCR negative.  Patient reports covid positive at cook count on 21  Symptom Onset: 21  Tmax: Afebrile  O2: 2L @ 99%, wean as tolerated    Monitor Labs,    LDH: 139 -> 244  Ferritin: 1888 -> 1688 -> 1763  CRP: 17.80 -> 1

## 2021-01-13 NOTE — PLAN OF CARE
Problem: Patient Centered Care  Goal: Patient preferences are identified and integrated in the patient's plan of care  Description: Interventions:  - What would you like us to know as we care for you?  I was just discharge from [facility] a week ago with Shanique Oconnor appropriate and evaluate response  - Consider cultural and social influences on pain and pain management  - Manage/alleviate anxiety  - Utilize distraction and/or relaxation techniques  - Monitor for opioid side effects  - Notify MD/LIP if interventions un for coordinating discharge planning if the patient needs post-hospital services based on physician/LIP order or complex needs related to functional status, cognitive ability or social support system  Outcome: Progressing     Problem: RESPIRATORY - ADULT  G

## 2021-01-13 NOTE — PLAN OF CARE
Problem: Patient Centered Care  Goal: Patient preferences are identified and integrated in the patient's plan of care  Description: Interventions:  - What would you like us to know as we care for you?  I was just discharge from [facility] a week ago with Chapis Tristan appropriate and evaluate response  - Consider cultural and social influences on pain and pain management  - Manage/alleviate anxiety  - Utilize distraction and/or relaxation techniques  - Monitor for opioid side effects  - Notify MD/LIP if interventions un ADULT  Goal: Achieves optimal ventilation and oxygenation  Description: INTERVENTIONS:  - Assess for changes in respiratory status  - Assess for changes in mentation and behavior  - Position to facilitate oxygenation and minimize respiratory effort  - Oxyg

## 2021-01-13 NOTE — PROGRESS NOTES
[facility]    Progress Note    Belle Headings Patient Status:  Inpatient    1970 MRN [id number]   Location [facility] 5SW/SE Attending Kamryn Shah MD   Hosp Day # 2 PCP PHYSICIAN NONSTAFF        Subjective:   Arteimo Escobedo --I agree this certainly a malignant process is top of the differential, but given his inflammatory markers, reactive leukocytosis/leukemoid reaction cannot rule out the possibility of infectious etiology, such as an atypical process including Mycobacteriu .0 (H) 01/12/2021    CREATSERUM 2.79 (H) 01/12/2021    BUN 58 (H) 01/12/2021     (L) 01/12/2021    K 4.9 01/12/2021     01/12/2021    CO2 22.0 01/12/2021     (H) 01/12/2021    CA 9.1 01/12/2021    ALB 1.4 (L) 01/11/2021    ALKPHO CONCLUSION:  1. Large heterogeneous/likely necrotic 8.4 x 8.2 x 11.2 cm right upper lobe lung mass, with associated complete consolidation of the right upper lobe.   There is an additional likely necrotic 4.5 cm satellite mass in the superior segment of the CONCLUSION: Perfusion lung scan findings are low probability for pulmonary embolism    Dictated by (CST): Du Hawkins MD on 1/11/2021 at 3:08 PM     Finalized by (CST): Du Hawkins MD on 1/11/2021 at 3:51 PM

## 2021-01-13 NOTE — PLAN OF CARE
Covid +: Rapid and PCR negative.  Patient reports covid positive at cook count on 1/01/21  Symptom Onset: 1/1/21  Tmax: Afebrile  O2: 1L @ 100% wean as tolerated    Monitor Labs, covid stable, will stop monitoring inflammatory markers  LDH: 139 -> 244  Ferr

## 2021-01-13 NOTE — PROGRESS NOTES
Procedure completed, site cleaned and dressed with guaze and tegaderm dressing. Return to bed for transfer to room 526. Report called.  Will await CXR

## 2021-01-13 NOTE — PROGRESS NOTES
[facility] Pharmacy Note:  Renal Adjustment for cefepime (MAXIPIME)    Conor Osborne is a 44year old patient who has been prescribed cefepime (MAXIPIME) 1000 mg every 24 hrs.   CrCl is estimated creatinine clearance is 27.3 mL/min (A) (based on SCr of 2.44 mg

## 2021-01-13 NOTE — PROGRESS NOTES
SITE VERIFIED AND PREP WITH CHLORAPREP AND 1% LIDOCAINE. CYTOLOGY CALLED. ACCESS SITE WITH 17 G X 11.1 CM BARD COAXIAL GUIDE.  CORE SAMPLES X 2 WITH 18 G X 16 CM BARD CORE BIOPSY NEEDLE

## 2021-01-13 NOTE — PLAN OF CARE
Pt A/O x4. VSS. 1L nasal cannula. Tele. 2 norco for pain. Zofran given for nausea this Am. Pt voiding per urinal, no BM. Abx infused. Fluids infusing. Lung biopsy today.   Problem: Patient Centered Care  Goal: Patient preferences are identified and integrat Assess pain using appropriate pain scale  - Administer analgesics based on type and severity of pain and evaluate response  - Implement non-pharmacological measures as appropriate and evaluate response  - Consider cultural and social influences on pain and patient/family/discharge partner  - Complete POLST form as appropriate  - Assess patient's ability to be responsible for managing their own health  - Refer to Case Management Department for coordinating discharge planning if the patient needs post-hospital

## 2021-01-13 NOTE — PROGRESS NOTES
RECEIVED PATIENT TO CT #2 FOR RIGHT UPPER LUNG MASS BIOPSY. VERY ANXIOUS. MONITOR CONNECTED. SUPINE POSITION. IV FLUIDS INFUSING.  DR Monty White PRESENT

## 2021-01-13 NOTE — PROGRESS NOTES
Body mass index is 30.9 kg/m². Currently with 50+ lb weight loss over the past two months.     - Adult regular diet once she can tolerate PO  - To have EGD today 6/25/19   COVID AND TB PRECAUTIONS.  STAFF WITH N95 MASK

## 2021-01-13 NOTE — PROGRESS NOTES
[facility]    Progress Note    Maritza Miles Patient Status:  Inpatient    1970 MRN [id number]   Location [facility] 5SW/SE Attending Ian Lim MD   Hosp Day # 3 PCP PHYSICIAN NONSTAFF       SUBJECTIVE:    Seen history of lung carcinoma. Therefore, these masses likely relate to neoplasm with probable associated extensive postobstructive pneumonia. Necrotic pneumonia without associated malignancy is considered unlikely given overall imaging morphology.   Small fo pneumothorax. Right upper lobe mass and collapse, unchanged.      Dictated by (CST): Belle Jesus MD on 1/13/2021 at 2:05 PM     Finalized by (CST): Belle Jesus MD on 1/13/2021 at 2:07 PM          Xr Chest Ap Portable  (cpt=71045)    Result Date: 1/10/202 Q15 Min PRN    •  0.9% NaCl infusion, , Intravenous, Continuous    •  acetaminophen (TYLENOL) tab 650 mg, 650 mg, Oral, Q4H PRN    Or    •  HYDROcodone-acetaminophen (NORCO) 5-325 MG per tab 1 tablet, 1 tablet, Oral, Q4H PRN    Or    •  HYDROcodone-acetami to gross elevations and history of lung CA hematology oncology consulted  -Appreciate recommendations  -ct reviewed     Heroin abuse/Tobacco  -Patient admits to 3 packs/day  -Last used on day of admission  -Monitor for signs of withdrawal  -Patient

## 2021-01-14 NOTE — PROGRESS NOTES
[facility]    Progress Note    Nereyda Wing Patient Status:  Inpatient    1970 MRN [id number]   Location [facility] 5SW/SE Attending Arzella Saint, MD   Hosp Day # 3 PCP PHYSICIAN NONSTAFF        Subjective:     Oli Huizar --Patient had a successful CT-guided biopsy today. 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 Final pathology will be available tomorrow. D/w patient preliminary results above.   D/w patient he will need to complete staging w/u Sharri Bruce [address]ks [address]34  [phone]       01/13/21        Results:     Lab Results   Component Value Date    .9 (HH) 01/13/2021    HGB 8.2 (L) 01/13/2021    HCT 26.1 (L) 01/13/2021    .0 (H) 01/13/2021    CREATSERUM

## 2021-01-14 NOTE — PROGRESS NOTES
[facility]     Progress Note        Kimmie Wiggins Patient Status:  Inpatient    1970 MRN [id number]   Location [facility] 5SW/SE Attending Calli Gonzalez MD   Hosp Day # 4 PCP PHYSICIAN NONSTAFF       Subjective:   Shaila Cagle 8 tablet, 8 tablet, Oral, Q15 Min PRN    •  0.9% NaCl infusion, , Intravenous, Continuous    •  acetaminophen (TYLENOL) tab 650 mg, 650 mg, Oral, Q4H PRN    Or    •  HYDROcodone-acetaminophen (NORCO) 5-325 MG per tab 1 tablet, 1 tablet, Oral, Q4H PRN    Or No large pneumothorax. Right upper lobe mass and collapse, unchanged.      Dictated by (CST): Ayush Rangel MD on 1/13/2021 at 2:05 PM     Finalized by (CST): Ayush Rangel MD on 1/13/2021 at 2:07 PM          Xr Abdomen Obstructive Series Routine(2 Vw)(cpt=7 Clinic

## 2021-01-14 NOTE — PLAN OF CARE
Problem: Patient Centered Care  Goal: Patient preferences are identified and integrated in the patient's plan of care  Description: Interventions:  - What would you like us to know as we care for you?  I was just discharged from [facility] a week ago with cov appropriate and evaluate response  - Consider cultural and social influences on pain and pain management  - Manage/alleviate anxiety  - Utilize distraction and/or relaxation techniques  - Monitor for opioid side effects  - Notify MD/LIP if interventions un for coordinating discharge planning if the patient needs post-hospital services based on physician/LIP order or complex needs related to functional status, cognitive ability or social support system  Outcome: Progressing     Problem: RESPIRATORY - ADULT  G

## 2021-01-14 NOTE — PLAN OF CARE
RRT    *See RRT Documentation Record*    Reason the RRT was called: AFIB 140s-150s s/p lung biopsy of right lung  Assessment of patient leading up to RRT: chest pain/afib  Interventions/Testing: EKG/RRT oral cardizem by Primary MD, increased BP meds.   Chelsea

## 2021-01-14 NOTE — PLAN OF CARE
Pt A/O x4. Tele. Hydralazine for elevated BP. Problem: Patient Centered Care  Goal: Patient preferences are identified and integrated in the patient's plan of care  Description: Interventions:  - What would you like us to know as we care for you?  I was ju response  - Implement non-pharmacological measures as appropriate and evaluate response  - Consider cultural and social influences on pain and pain management  - Manage/alleviate anxiety  - Utilize distraction and/or relaxation techniques  - Monitor for op their own health  - Refer to Case Management Department for coordinating discharge planning if the patient needs post-hospital services based on physician/LIP order or complex needs related to functional status, cognitive ability or social support system

## 2021-01-14 NOTE — PLAN OF CARE
Covid +: Rapid and PCR negative.  Patient reports covid positive at cook count on 1/01/21  Symptom Onset: 1/1/21  Tmax: Afebrile  O2: 1L @ 100% wean as tolerated    Monitor Labs, covid stable, will stop monitoring inflammatory markers  LDH: 139 -> 244 163

## 2021-01-14 NOTE — PROGRESS NOTES
[facility]    Progress Note    [facility] Patient Status:  Inpatient    1970 MRN [id number]   Location [facility] 5SW/SE Attending Flakita Kemp MD   Hosp Day # 4 PCP PHYSICIAN NONSTAFF       SUBJECTIVE:    Discu 1/13/2021  CONCLUSION:   No large pneumothorax. Right upper lobe mass and collapse, unchanged.      Dictated by (CST): Vidal Dumont MD on 1/13/2021 at 2:05 PM     Finalized by (CST): Vidal Dumont MD on 1/13/2021 at 2:07 PM          Xr Abdomen Obstructive S MG per tab 1 tablet, 1 tablet, Oral, Q4H PRN    Or    •  HYDROcodone-acetaminophen (NORCO) 5-325 MG per tab 2 tablet, 2 tablet, Oral, Q4H PRN    •  Insulin Aspart Pen (NOVOLOG) 100 UNIT/ML flexpen 1-7 Units, 1-7 Units, Subcutaneous, TID CC    •  Vancomycin consulted  -Appreciate recommendations  -ct reviewed     Heroin abuse/Tobacco  -Patient admits to 3 packs/day  -Last used on day of admission  -Monitor for signs of withdrawal  -Patient counseled on need for cessation     HTN  -added clonidine    Elevated

## 2021-01-14 NOTE — PROGRESS NOTES
[facility]    Progress Note    Liana Crensahw Patient Status:  Inpatient    1970 MRN [id number]   Location [facility] 5SW/SE Attending Rxoy Rees MD   Hosp Day # 3 PCP PHYSICIAN NONSTAFF        Subjective:     Con cancer /complicated with recent diagnosis of COVID-19     S/p ct guided biopsy by IR on 1/13/2021 / path pending     3- post obstructive pneumonia   cefepime and vancomycin      4- severe leukocytosis   Chronic / reported since 10/2020   Secondary to infec Huang Rodriguez MD  1/13/2021

## 2021-01-15 NOTE — PROGRESS NOTES
[facility] dosing service    Follow-up Pharmacokinetic Consult for Vancomycin Dosing     Betty Payne is a 44year old patient who is being treated for PNA. Patient is on day 5 of Vancomycin and is currently receiving 750 mg IV Q 48 hours.   Goal t

## 2021-01-15 NOTE — PROGRESS NOTES
Discharge RN Summary: Patient has discharge order in. Patient to discharge home. IV removed by this RN. Understands to follow up with PCP in 1 week. Patient understands to  7 new medications with printed prescriptions.  Norco signed by MD.

## 2021-01-15 NOTE — PLAN OF CARE
Problem: Patient Centered Care  Goal: Patient preferences are identified and integrated in the patient's plan of care  Description: Interventions:  - What would you like us to know as we care for you?  I was just discharged from [facility] [place] a week ago with cov response  - Implement non-pharmacological measures as appropriate and evaluate response  - Consider cultural and social influences on pain and pain management  - Manage/alleviate anxiety  - Utilize distraction and/or relaxation techniques  - Monitor for op to be responsible for managing their own health  - Refer to Case Management Department for coordinating discharge planning if the patient needs post-hospital services based on physician/LIP order or complex needs related to functional status, cognitive rodrigo

## 2021-01-15 NOTE — PROGRESS NOTES
Met with patient tonight to discuss pathology report. Sister Bunny Pisano was on speaker phone. 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   Discussed that need to complete staging w/u which he has arranged at [facility] ([facility]) n

## 2021-01-15 NOTE — PAYOR COMM NOTE
--------------  ADMISSION REVIEW     Payor: Elo Robbins #:  [id number]  Authorization Number: [id number]    Admit date: 1/10/21  Admit time: 2100       Admitting Physician:   Attending Physician:  Rhiannon Way MD  Primary Care Physician: Sami Artis Genitourinary: Negative for dysuria and hematuria. Positive for stated complaint: shortness of breath and cp  Other systems are as noted in HPI. Constitutional and vital signs reviewed. All other systems reviewed and negative except as noted above. All other components within normal limits   LACTIC ACID, PLASMA - Abnormal; Notable for the following components:    Lactic Acid 2.2 (*)     All other components within normal limits   FERRITIN - Abnormal; Notable for the following components:    Ferritin DIFFERENTIAL DIAGNOSIS: After history and physical exam differential diagnosis includes but is not limited to COVID, ACS, VTE, anemia, electrolyte derangement, lobar PNA, neoplasm.     Pulse ox: 100%:Normal on RA, as interpreted by myself    Cardiac Monitor Related Notes: Original Note by Richie Reynolds MD (Physician) filed at 1/10/2021  8:26 PM         Ronal Shipley Patient Status:  Emergency    1970  44year old [facility] [id number]   [facility] EXTREMITIES: Trace bilateral lower extremity edema over feet   NEUROLOGICAL:  There was no new focal deficit. SKIN:  Warm and well perfused  PSYCHIATRIC: Normal mood    IMAGING  Xr Chest Ap Portable  (cpt=71045)  Result Date: 1/10/2021  CONCLUSION:  1. Lucille Jeffries MD   1/10/2021  8:34 PM    Pulmonology  Reason for Consultation:   covid 19   Pneumonia   Lung cancer     Blood pressure (!) 176/85, pulse 87, temperature 100.3 °F (37.9 °C), temperature source Oral, resp.  rate 20, height 6' 2\" (1.88 m) CONCLUSION:  1. Large heterogeneous/likely necrotic 8.4 x 8.2 x 11.2 cm right upper lobe lung mass, with associated complete consolidation of the right upper lobe.   There is an additional likely necrotic 4.5 cm satellite mass in the superior segment of the Decadron      2- post obstructive pneumonia   cefepime and vancomycin      3- severe leukocytosis   Secondary to infection and possible leukemoid reaction as well      4-lung cancer with very large right upper lobe Pancoast tumor with complete obstruction 47-year-old male with a history of diabetes, HTN, lung cancer with recent diagnosis of Covid on 1/2 now with worsening cough, shortness of breath, fatigue, weakness. Unclear about duration of symptoms.  Tested positive for COVID-19 on 1/2/21 and states mayb # Daily heroin use (snort) - unclear if IVDU - r/o HIV, hepatitis, syphilis  # Severe leukocytosis with bandemia - infection related vs malignancy vs leukamoid; r/o bacteremia  # Bullous emphysema  # CKD     PLAN:  -  Started on RDV - will discuss with Pul --I agree with certainty a malignant process is top of the differential, but given his inflammatory markers, reactive leukocytosis/leukemoid reaction cannot rule out the possibility of infectious etiology, such as an atypical process including Mycobacteriu Blood pressure 140/82, pulse 61, temperature 97.2 °F (36.2 °C), temperature source Oral, resp.  rate 20, height 6' 2\" (1.88 m), weight 120 lb 2 oz (54.5 kg), SpO2 99 %    Component Value Date     .4 (HH) 01/12/2021     HGB 7.4 (L) 01/12/2021     HCT Antibiotics: Azithromycin and Cefepime  Blood Thinner: heparin Q8  Decadron: started 01/11  Actemra: on hold  RDV: started 01/11  CCP: on hold     VQ scan 01/11 - low probability for PE     ID following  Pulm Following  Oncology following      Daily heroin   GLU 75 01/14/2021     CA 8.8 01/14/2021     DDIMER 3.67 01/14/2021     CRP 5.09 01/14/2021     MG 2.2 01/14/2021      Xr Abdomen Obstructive Series Routine(2 Vw)(cpt=74019   Result Date: 1/14/2021  CONCLUSION:  1. Mild reflex ileus.  2. Vascular calcifica 01/15/21 0349 97.5 °F (36.4 °C) 54 18 155/83 100 % — Nasal cannula 1 L/min      MEDICATIONS ADMINISTERED SINCE ADMISSION     01/10/21 01/11/21 01/12/21 01/13/21 01/14/21 01/15/21   azithromycin (ZITHROMAX) 500 mg in sodium chloride 0.9% 250 mL IVPB   Dose: Freq: Once Route: IM  Start: 01/10/21 1637 End: 01/10/21 1707    1707-Given             dilTIAZem (DILACOR XR) 24 hr cap 120 mg   Dose: 120 mg  Freq: Daily Route: OR  Start: 01/13/21 2949    Admin Instructions:   Swallow whole. Do not chew, break or crush. Senna (SENOKOT) tab 17.2 mg   Dose: 17.2 mg  Freq: Nightly Route: OR  Start: 01/15/21 2100         2100        sodium chloride 0.9% IV bolus 1,000 mL   Dose: 1,000 mL  Freq:  Once Route: IV  Last Dose: 1,000 mL (01/10/21 1900)  Start: 01/10/21 1747 End: 01/ What infection is this being used to treat?  Stat/one time dose    2040-New Bag [C]     2054-Handoff                   Medications 01/10/21 01/11/21 01/12/21 01/13/21 01/14/21 01/15/21   0.9% NaCl infusion   Rate: 100 mL/hr Freq: Continuous Route: IV  Start PRN Reason: Cramping  Start: 01/12/21 0309      0343-Given       0618-Given     1337-Given      0133-Given     0906-Given        fentaNYL citrate (SUBLIMAZE) 0.05 MG/ML injection   Freq: Code/trauma medication Route: IV  PRN Comment: Sedation  Start: 01/13

## 2021-01-15 NOTE — DISCHARGE SUMMARY
[facility]    Discharge Summary    Betty Payne Patient Status:  Inpatient    1970 MRN [id number]   Location [facility] 5SW/SE Attending Rhiannon Way MD   Hosp Day # 5 PCP PHYSICIAN NONSTAFF     Date of Admission: previously diagnosed with Covid at [facility] ([facility] about 9 days ago. Patient reports an associated cough productive of brownish sputum. He also noted generalized fatigue but denies fevers or chills.   Patient does admit to daily use of heroin, typically snort cessation     HTN  -added clonidine     Elevated D-dimer  Possibly related to Covid as above  Due to grossly elevated levels, checking perfusion scan  Vq low probability for pe     A.  Fib w/ rvr  -brief episode yesterday  -now NSR  -plan rate control     P acetaminophen 325 MG Tabs  Commonly known as: TYLENOL      Take 325 mg by mouth every 6 (six) hours as needed for Pain. Refills: 0     Albuterol Sulfate  (90 Base) MCG/ACT Aers      Inhale 2 puffs into the lungs.    Refills: 0     docusate sodium

## 2021-01-15 NOTE — PLAN OF CARE
Problem: Patient Centered Care  Goal: Patient preferences are identified and integrated in the patient's plan of care  Description: Interventions:  - What would you like us to know as we care for you?  I was just discharged from [facility] a week ago with cov appropriate and evaluate response  - Consider cultural and social influences on pain and pain management  - Manage/alleviate anxiety  - Utilize distraction and/or relaxation techniques  - Monitor for opioid side effects  - Notify MD/LIP if interventions un anxiety  - Monitor for signs/symptoms of CO2 retention  Outcome: Progressing     Alert and oriented x4. Elevated BP,PRN Hydralazine administered. C/O of severe abdominal pain/cramp. PRN Norco and Bentyl were given to relieve pain.   PRN Zofran was also gi

## 2021-01-15 NOTE — CM/SW NOTE
SW monitoring for home O2 needs. RN to complete walk test with patient to determine home O2 needs.     Documentation for O2 sats: (RN to add to progress note)  Patient's O2 sat on room air is ____% at rest. Pt's O2 sat on room is ____% when ambulating, and

## 2021-01-16 NOTE — TELEPHONE ENCOUNTER
Patient is calling indicating that he was discharged from room 526 and accidentally left his inhaler in the room upon discharge. The inhaler was thrown away. Patient is [facility] [place].

## 2021-01-16 NOTE — PAYOR COMM NOTE
--------------  DISCHARGE REVIEW    Payor: Nohemi Kevin #:  [id number]  Authorization Number: [id number]    Admit date: 1/10/21  Admit time:  2100  Discharge Date: 1/15/2021 12:19 PM     Admitting Physician:   Attending Physician:  Gladis att. providers + temporal wasting, mmm  Pulmonary:  clear to auscultation bilaterally  Cardiovascular: S1, S2 normal, no murmur, click, rub or gallop, regular rate and rhythm  Abdominal: soft, non-tender; bowel sounds normal; no masses,  no organomegaly  Extremities: ext infection  - COVID positive 1/2  -Decadron as no signs of hypoxia and concerns of bacterial infection  -Monitor inflammatory markers  -Started on broad-spectrum antibiotics due to elevated procal      Leukocytosis  -WBC of 141 initial labs  -Possibly relat NORCO      Take 1 tablet by mouth every 4 (four) hours as needed. Quantity: 20 tablet  Refills: 0     Sennosides 17.2 MG Tabs      Take 1 tablet (17.2 mg total) by mouth nightly.    Quantity: 60 tablet  Refills: 0     vancomycin HCl 125 MG Caps  Commonly

## 2021-01-27 NOTE — TELEPHONE ENCOUNTER
I spoke to [employer] on 1/27/21 regarding her after hour message from the answering service.  I explained to Carolina Becerra that we do not accept [employer] and that Roberto Navarrete would have to go to an [employer] that accepts Leni and Carolina Becerra

## 2021-04-29 ENCOUNTER — LAB REQUISITION (OUTPATIENT)
Dept: LAB | Age: 51
End: 2021-04-29
Attending: INTERNAL MEDICINE
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
## 2021-04-29 LAB
ANION GAP SERPL CALC-SCNC: 14 MMOL/L (ref 10–20)
BASOPHILS # BLD: 0.1 K/MCL (ref 0–0.3)
BASOPHILS NFR BLD: 1 %
BUN SERPL-MCNC: 52 MG/DL (ref 6–20)
BUN/CREAT SERPL: 28 (ref 7–25)
CALCIUM SERPL-MCNC: 8.8 MG/DL (ref 8.4–10.2)
CHLORIDE SERPL-SCNC: 119 MMOL/L (ref 98–107)
CO2 SERPL-SCNC: 20 MMOL/L (ref 21–32)
CREAT SERPL-MCNC: 1.85 MG/DL (ref 0.67–1.17)
DEPRECATED RDW RBC: 69.5 FL (ref 39–50)
EOSINOPHIL # BLD: 0.2 K/MCL (ref 0–0.5)
EOSINOPHIL NFR BLD: 3 %
ERYTHROCYTE [DISTWIDTH] IN BLOOD: 18.4 % (ref 11–15)
FASTING DURATION TIME PATIENT: ABNORMAL H
GFR SERPLBLD BASED ON 1.73 SQ M-ARVRAT: 41 ML/MIN/1.73M2
GLUCOSE SERPL-MCNC: 114 MG/DL (ref 65–99)
HCT VFR BLD CALC: 27.9 % (ref 39–51)
HGB BLD-MCNC: 8.4 G/DL (ref 13–17)
IMM GRANULOCYTES # BLD AUTO: 0 K/MCL (ref 0–0.2)
IMM GRANULOCYTES # BLD: 0 %
LYMPHOCYTES # BLD: 0.6 K/MCL (ref 1–4)
LYMPHOCYTES NFR BLD: 10 %
MCH RBC QN AUTO: 30.9 PG (ref 26–34)
MCHC RBC AUTO-ENTMCNC: 30.1 G/DL (ref 32–36.5)
MCV RBC AUTO: 102.6 FL (ref 78–100)
MONOCYTES # BLD: 0.8 K/MCL (ref 0.3–0.9)
MONOCYTES NFR BLD: 14 %
NEUTROPHILS # BLD: 3.8 K/MCL (ref 1.8–7.7)
NEUTROPHILS NFR BLD: 72 %
NRBC BLD MANUAL-RTO: 0 /100 WBC
PLATELET # BLD AUTO: 270 K/MCL (ref 140–450)
POTASSIUM SERPL-SCNC: 4.5 MMOL/L (ref 3.4–5.1)
RBC # BLD: 2.72 MIL/MCL (ref 4.5–5.9)
SODIUM SERPL-SCNC: 148 MMOL/L (ref 135–145)
WBC # BLD: 5.3 K/MCL (ref 4.2–11)

## 2021-04-29 PROCEDURE — 85025 COMPLETE CBC W/AUTO DIFF WBC: CPT | Performed by: CLINICAL MEDICAL LABORATORY

## 2021-04-29 PROCEDURE — 80048 BASIC METABOLIC PNL TOTAL CA: CPT | Performed by: CLINICAL MEDICAL LABORATORY

## 2021-04-29 PROCEDURE — PSEU8235 BASIC METABOLIC PANEL: Performed by: CLINICAL MEDICAL LABORATORY

## 2021-04-30 LAB — RAINBOW EXTRA TUBES HOLD SPECIMEN: NORMAL

## (undated) NOTE — LETTER
[facility], [place]  Authorization for Invasive Procedures  1.  I hereby authorize Dr. Nima Ponce , my physician and whomever may be designated as the doctor's assistant, to perform the following operation and/or procedure:  CT 4. Should the need arise during my operation or immediate post-operative period; I also consent to the administration of blood and/or blood products.  Further, I understand that despite careful testing and screening of blood and blood products, I may still 9. Patients having a sterilization procedure: I understand that if the procedure is successful the results will be permanent and it will therefore be impossible for me to inseminate, conceive or bear children.  I also understand that the procedure is intend

## (undated) NOTE — LETTER
[address] [place] [address]  Authorization for Invasive Procedures  1.  I hereby authorize Dr. Erika Mejia , my physician and whomever may be designated as the doctor's assistant, to perform the following operation and/or procedure:  C 4. Should the need arise during my operation or immediate post-operative period; I also consent to the administration of blood and/or blood products.  Further, I understand that despite careful testing and screening of blood and blood products, I may still 9. Patients having a sterilization procedure: I understand that if the procedure is successful the results will be permanent and it will therefore be impossible for me to inseminate, conceive or bear children.  I also understand that the procedure is intend